# Patient Record
Sex: FEMALE | Race: ASIAN
[De-identification: names, ages, dates, MRNs, and addresses within clinical notes are randomized per-mention and may not be internally consistent; named-entity substitution may affect disease eponyms.]

---

## 2020-09-02 ENCOUNTER — HOSPITAL ENCOUNTER (OUTPATIENT)
Dept: HOSPITAL 72 - RAD | Age: 65
Discharge: HOME | End: 2020-09-02
Payer: MEDICARE

## 2020-09-02 DIAGNOSIS — Z91.81: ICD-10-CM

## 2020-09-02 DIAGNOSIS — M25.562: Primary | ICD-10-CM

## 2020-09-04 NOTE — DIAGNOSTIC IMAGING REPORT
Indication: Trauma, left knee pain

 

Technique: 2 views of the left knee 

 

Comparison: none

 

Findings: There is minimal medial compartmental degenerative joint space narrowing.

No acute fractures. No dislocations. No suprapatellar effusion. There are

degenerative proliferative changes of the superior pole of the patella.

 

Impression: No acute process

 

Minimal degenerative changes